# Patient Record
Sex: FEMALE | Race: WHITE | HISPANIC OR LATINO | URBAN - METROPOLITAN AREA
[De-identification: names, ages, dates, MRNs, and addresses within clinical notes are randomized per-mention and may not be internally consistent; named-entity substitution may affect disease eponyms.]

---

## 2017-01-19 NOTE — PATIENT DISCUSSION
CARLOS ENRIQUE OU:  PRESCRIBED UV PROTECTION TO SLOW GROWTH. PRESCRIBE ARTIFICAL TEARS TO INCREASE COMFORT.

## 2017-01-19 NOTE — PATIENT DISCUSSION
POSTERIOR VITREOUS DETACHMENT, OU:  NO HOLES. NO TEARS. RETINAL  DETACHMENT WARNINGS DISCUSSED. FLOATERS AND FLASHES BROCHURE GIVEN. RETURN FOR FOLLOW-UP AS SCHEDULED.

## 2022-02-07 NOTE — PATIENT DISCUSSION
WORSENING PER PATIENT, CONSIDER NEURO REFERRAL AFTER DISCUSSING OPTIONS WITH PCP.  NO OCULAR INVOLVEMENT ON TODAYS DILATED EXAM.

## 2022-02-07 NOTE — PATIENT DISCUSSION
PRESCRIBE WARM COMPRESSES AND EYELID SCRUBS QD-BID, ARTIFICIAL TEARS BID-QID, AND ERYTHROMYCIN OPHTHALMIC OINTMENT EVERY NIGHT AS NEEDED. RETURN FOR FOLLOW-UP AS SCHEDULED.  HANDWRITTEN RX FOR E-MYCIN GIVEN TODAY.

## 2022-08-03 ENCOUNTER — ESTABLISHED PATIENT (OUTPATIENT)
Dept: URBAN - METROPOLITAN AREA CLINIC 20 | Facility: CLINIC | Age: 72
End: 2022-08-03

## 2022-08-03 DIAGNOSIS — H35.372: ICD-10-CM

## 2022-08-03 DIAGNOSIS — Q12.0: ICD-10-CM

## 2022-08-03 DIAGNOSIS — H52.03: ICD-10-CM

## 2022-08-03 DIAGNOSIS — D49.2: ICD-10-CM

## 2022-08-03 PROCEDURE — 92310C CONTACT LENS 75

## 2022-08-03 PROCEDURE — 92015 DETERMINE REFRACTIVE STATE: CPT

## 2022-08-03 PROCEDURE — 92014 COMPRE OPH EXAM EST PT 1/>: CPT

## 2022-08-03 ASSESSMENT — VISUAL ACUITY
OU_CC: 20/20-1
OD_CC: 20/25-2
OU_CC: 20/25
OS_CC: 20/20-2

## 2022-08-03 ASSESSMENT — KERATOMETRY
OD_K2POWER_DIOPTERS: 45.75
OS_K2POWER_DIOPTERS: 47.00
OD_AXISANGLE_DEGREES: 43
OD_AXISANGLE2_DEGREES: 133
OD_K1POWER_DIOPTERS: 45.50
OS_AXISANGLE_DEGREES: 12
OS_AXISANGLE2_DEGREES: 102
OS_K1POWER_DIOPTERS: 46.75

## 2022-08-03 ASSESSMENT — TONOMETRY
OS_IOP_MMHG: 18
OD_IOP_MMHG: 17

## 2023-08-18 ENCOUNTER — ESTABLISHED PATIENT (OUTPATIENT)
Dept: URBAN - METROPOLITAN AREA CLINIC 20 | Facility: CLINIC | Age: 73
End: 2023-08-18

## 2023-08-18 DIAGNOSIS — H52.4: ICD-10-CM

## 2023-08-18 DIAGNOSIS — H43.393: ICD-10-CM

## 2023-08-18 DIAGNOSIS — H25.813: ICD-10-CM

## 2023-08-18 DIAGNOSIS — H35.372: ICD-10-CM

## 2023-08-18 PROCEDURE — 92015 DETERMINE REFRACTIVE STATE: CPT

## 2023-08-18 PROCEDURE — 99214 OFFICE O/P EST MOD 30 MIN: CPT

## 2023-08-18 ASSESSMENT — KERATOMETRY
OS_AXISANGLE_DEGREES: 12
OS_AXISANGLE2_DEGREES: 102
OS_K1POWER_DIOPTERS: 46.75
OS_K2POWER_DIOPTERS: 47.00
OD_K1POWER_DIOPTERS: 45.50
OD_K2POWER_DIOPTERS: 45.75
OD_AXISANGLE2_DEGREES: 133
OD_AXISANGLE_DEGREES: 43

## 2023-08-18 ASSESSMENT — VISUAL ACUITY
OD_CC: 20/20-1
OS_CC: 20/20-2

## 2023-08-18 ASSESSMENT — TONOMETRY
OD_IOP_MMHG: 16
OS_IOP_MMHG: 16

## 2024-10-23 ENCOUNTER — COMPREHENSIVE EXAM (OUTPATIENT)
Dept: URBAN - METROPOLITAN AREA CLINIC 20 | Facility: CLINIC | Age: 74
End: 2024-10-23

## 2024-10-23 DIAGNOSIS — H52.4: ICD-10-CM

## 2024-10-23 DIAGNOSIS — H25.813: ICD-10-CM

## 2024-10-23 PROCEDURE — 92310-3 LEVEL 3 SOFT LENS UPDATE

## 2024-10-23 PROCEDURE — 92014 COMPRE OPH EXAM EST PT 1/>: CPT

## 2024-10-23 PROCEDURE — 92015 DETERMINE REFRACTIVE STATE: CPT
